# Patient Record
Sex: FEMALE | Race: WHITE
[De-identification: names, ages, dates, MRNs, and addresses within clinical notes are randomized per-mention and may not be internally consistent; named-entity substitution may affect disease eponyms.]

---

## 2018-11-25 ENCOUNTER — HOSPITAL ENCOUNTER (EMERGENCY)
Dept: HOSPITAL 38 - CC.ED | Age: 73
Discharge: HOME | End: 2018-11-25
Payer: MEDICARE

## 2018-11-25 DIAGNOSIS — I10: ICD-10-CM
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
## 2018-11-25 DIAGNOSIS — S50.02XA: Primary | ICD-10-CM

## 2018-11-25 DIAGNOSIS — F17.210: ICD-10-CM

## 2018-11-25 NOTE — EDM.PDOC
ED HPI GENERAL MEDICAL PROBLEM





- General


Chief Complaint: Upper Extremity Injury/Pain


Stated Complaint: L)ARM HEMATOMA WITH TINGLING


Time Seen by Provider: 11/25/18 09:50


Source of Information: Reports: Patient


History Limitations: Reports: No Limitations





- History of Present Illness


INITIAL COMMENTS - FREE TEXT/NARRATIVE: 





patient reports she had a blood draw approx 2 days PTA and now reports a large 

bruise at the site of the draw. she denies other concerns.


Onset: Gradual


Location: Reports: Upper Extremity, Left


Quality: Reports: Other (bruising, tingling)


Severity: Mild


Context: Reports: Other (blood draw)





- Related Data


 Allergies











Allergy/AdvReac Type Severity Reaction Status Date / Time


 


No Known Allergies Allergy   Verified 11/25/18 09:52











Home Meds: 


 Home Meds





Aspirin [Halfprin] 81 mg PO DAILY 11/25/18 [History]


Lisinopril [Prinivil] 10 mg PO DAILY 11/25/18 [History]


Metoprolol Succinate 25 mg PO DAILY 11/25/18 [History]


atorvaSTATin [Lipitor] 10 mg PO DAILY 11/25/18 [History]











Past Medical History


Cardiovascular History: Reports: High Cholesterol, Hypertension





Social & Family History





- Family History


Family Medical History: Noncontributory





- Tobacco Use


Smoking Status *Q: Current Every Day Smoker


Years of Tobacco use: 30


Packs/Tins Daily: 1





- Caffeine Use


Caffeine Use: Reports: Coffee





- Recreational Drug Use


Recreational Drug Use: No





Review of Systems





- Review of Systems


Review Of Systems: See Below


Constitutional: Denies: Chills, Fever


Respiratory: Denies: Shortness of Breath, Wheezing


Cardiovascular: Denies: Chest Pain


GI/Abdominal: Denies: Abdominal Pain, Nausea, Vomiting


Musculoskeletal: Denies: Neck Pain


Skin: Reports: Bruising.  Denies: Rash, Lesions


Neurological: Denies: Confusion, Dizziness, Syncope, Weakness





ED EXAM, GENERAL





- Physical Exam


Exam: See Below


Exam Limited By: No Limitations


General Appearance: Alert, WD/WN, No Apparent Distress


Head: Atraumatic, Normocephalic


Neck: Normal Inspection, Supple, Non-Tender


Respiratory/Chest: No Respiratory Distress


Cardiovascular: Normal Peripheral Pulses, Regular Rate, Rhythm, No Edema, No 

Gallop, No JVD, No Murmur, No Rub


Peripheral Pulses: 2+: Radial (L), Radial (R)


Extremities: Normal Capillary Refill, Other (approx 4 cm diameter bruise to the 

LEFT AC. extremities otherwise unremarkable.)


Neurological: Alert, Oriented, CN II-XII Intact, Normal Cognition, Normal Gait, 

Normal Reflexes, No Motor/Sensory Deficits


Psychiatric: Normal Affect, Normal Mood


Skin Exam: Warm, Dry, Intact, Normal Color, No Rash, Other (approx 4 cm 

diameter bruise to LEFT AC. skin exam otherwise without acute findings.)





Course





- Vital Signs


Last Recorded V/S: 


 Last Vital Signs











Temp  36.8 C   11/25/18 09:40


 


Pulse  54 L  11/25/18 09:40


 


Resp  20   11/25/18 09:40


 


BP  171/62 H  11/25/18 09:40


 


Pulse Ox  100   11/25/18 09:40














Departure





- Departure


Time of Disposition: 10:19


Disposition: Home, Self-Care 01


Condition: Good


Clinical Impression: 


 Bruising








- Discharge Information


*PRESCRIPTION DRUG MONITORING PROGRAM REVIEWED*: Not Applicable


*COPY OF PRESCRIPTION DRUG MONITORING REPORT IN PATIENT ANAHI: Not Applicable


Referrals: 


[facility] [Primary Care Provider] - 


Forms:  ED Department Discharge


Additional Instructions: 


REST


HYDRATE


ALTERNATE RICE AND HEAT ON THE BRUISE


FOLLOW UP WITH YOUR PCP IN 3-5 DAYS FOR A REPEAT EXAM


GO TO THE ER IF CHANGE OR WORSE





- Problem List Review


Problem List Initiated/Reviewed/Updated: Yes





- Assessment/Plan


Assessment:: 





Bruising


Simple bruising as a result of blood draw. No s/s infection. Advised patient to 

rest, hydrate, alternate heat and ice to the area, follow up with PCP in 3-5 

days, go to ER if change or worse. Patient reports understanding and agreement 

with plan. DC home stable in care of spouse.